# Patient Record
Sex: FEMALE | Race: BLACK OR AFRICAN AMERICAN | NOT HISPANIC OR LATINO | Employment: PART TIME | URBAN - METROPOLITAN AREA
[De-identification: names, ages, dates, MRNs, and addresses within clinical notes are randomized per-mention and may not be internally consistent; named-entity substitution may affect disease eponyms.]

---

## 2020-10-13 ENCOUNTER — HISTORICAL (OUTPATIENT)
Dept: ADMINISTRATIVE | Facility: HOSPITAL | Age: 35
End: 2020-10-13

## 2020-10-15 LAB — FINAL CULTURE: NORMAL

## 2020-11-06 ENCOUNTER — HOSPITAL ENCOUNTER (OUTPATIENT)
Dept: MEDSURG UNIT | Facility: HOSPITAL | Age: 35
End: 2020-11-07
Attending: OBSTETRICS & GYNECOLOGY | Admitting: OBSTETRICS & GYNECOLOGY

## 2020-11-06 LAB
ABS NEUT (OLG): 6.37 X10(3)/MCL (ref 2.1–9.2)
ALBUMIN SERPL-MCNC: 3.6 GM/DL (ref 3.5–5)
ALBUMIN/GLOB SERPL: 0.9 RATIO (ref 1.1–2)
ALP SERPL-CCNC: 52 UNIT/L (ref 40–150)
ALT SERPL-CCNC: 12 UNIT/L (ref 0–55)
AMYLASE SERPL-CCNC: 53 UNIT/L (ref 25–125)
APPEARANCE, UA: ABNORMAL
AST SERPL-CCNC: 15 UNIT/L (ref 5–34)
B-HCG FREE SERPL-ACNC: ABNORMAL MIU/ML
BACTERIA #/AREA URNS AUTO: ABNORMAL /HPF
BASOPHILS # BLD AUTO: 0 X10(3)/MCL (ref 0–0.2)
BASOPHILS NFR BLD AUTO: 0 %
BILIRUB SERPL-MCNC: 0.5 MG/DL
BILIRUB UR QL STRIP: NEGATIVE
BILIRUBIN DIRECT+TOT PNL SERPL-MCNC: 0.2 MG/DL (ref 0–0.5)
BILIRUBIN DIRECT+TOT PNL SERPL-MCNC: 0.3 MG/DL (ref 0–0.8)
BUN SERPL-MCNC: 6 MG/DL (ref 7–18.7)
CALCIUM SERPL-MCNC: 8.8 MG/DL (ref 8.4–10.2)
CHLORIDE SERPL-SCNC: 105 MMOL/L (ref 98–107)
CO2 SERPL-SCNC: 24 MMOL/L (ref 22–29)
COLOR UR: YELLOW
CREAT SERPL-MCNC: 0.68 MG/DL (ref 0.55–1.02)
CROSSMATCH INTERPRETATION: NORMAL
EOSINOPHIL # BLD AUTO: 0.3 X10(3)/MCL (ref 0–0.9)
EOSINOPHIL NFR BLD AUTO: 3 %
ERYTHROCYTE [DISTWIDTH] IN BLOOD BY AUTOMATED COUNT: 13.4 % (ref 11.5–14.5)
GLOBULIN SER-MCNC: 3.8 GM/DL (ref 2.4–3.5)
GLUCOSE (UA): NEGATIVE
GLUCOSE SERPL-MCNC: 83 MG/DL (ref 74–100)
HCT VFR BLD AUTO: 29.5 % (ref 35–46)
HGB BLD-MCNC: 9.4 GM/DL (ref 12–16)
HGB UR QL STRIP: >1 MG/DL
HYALINE CASTS #/AREA URNS LPF: ABNORMAL /LPF
IMM GRANULOCYTES # BLD AUTO: 0.03 10*3/UL
IMM GRANULOCYTES NFR BLD AUTO: 0 %
KETONES UR QL STRIP: >150 MG/DL
LEUKOCYTE ESTERASE UR QL STRIP: NEGATIVE
LIPASE SERPL-CCNC: 21 U/L
LYMPHOCYTES # BLD AUTO: 1.9 X10(3)/MCL (ref 0.6–4.6)
LYMPHOCYTES NFR BLD AUTO: 21 %
MCH RBC QN AUTO: 29.5 PG (ref 26–34)
MCHC RBC AUTO-ENTMCNC: 31.9 GM/DL (ref 31–37)
MCV RBC AUTO: 92.5 FL (ref 80–100)
MONOCYTES # BLD AUTO: 0.7 X10(3)/MCL (ref 0.1–1.3)
MONOCYTES NFR BLD AUTO: 8 %
MUCOUS THREADS URNS QL MICRO: SLIGHT
NEUTROPHILS # BLD AUTO: 6.37 X10(3)/MCL (ref 2.1–9.2)
NEUTROPHILS NFR BLD AUTO: 69 %
NITRITE UR QL STRIP: NEGATIVE
PH UR STRIP: 6 [PH] (ref 4.5–8)
PLATELET # BLD AUTO: 141 X10(3)/MCL (ref 130–400)
PMV BLD AUTO: 12.1 FL (ref 7.4–10.4)
POC BETA-HCG (QUAL): POSITIVE
POTASSIUM SERPL-SCNC: 3.8 MMOL/L (ref 3.5–5.1)
PRODUCT READY: NORMAL
PROT SERPL-MCNC: 7.4 GM/DL (ref 6.4–8.3)
PROT UR QL STRIP: 100 MG/DL
RBC # BLD AUTO: 3.19 X10(6)/MCL (ref 4–5.2)
RBC #/AREA URNS AUTO: >=100 /HPF
SARS-COV-2 AG RESP QL IA.RAPID: NEGATIVE
SODIUM SERPL-SCNC: 138 MMOL/L (ref 136–145)
SP GR UR STRIP: 1.03 (ref 1–1.03)
SQUAMOUS #/AREA URNS LPF: ABNORMAL /LPF
UROBILINOGEN UR STRIP-ACNC: NORMAL
WBC # SPEC AUTO: 9.3 X10(3)/MCL (ref 4.5–11)
WBC #/AREA URNS AUTO: ABNORMAL /HPF

## 2020-11-07 LAB
ABS NEUT (OLG): 10.74 X10(3)/MCL (ref 2.1–9.2)
ABS NEUT (OLG): 8.97 X10(3)/MCL (ref 2.1–9.2)
BASOPHILS # BLD AUTO: 0 X10(3)/MCL (ref 0–0.2)
BASOPHILS NFR BLD AUTO: 0 %
BASOPHILS NFR BLD MANUAL: 0 %
EOSINOPHIL # BLD AUTO: 0.3 X10(3)/MCL (ref 0–0.9)
EOSINOPHIL NFR BLD AUTO: 2 %
EOSINOPHIL NFR BLD MANUAL: 1 %
ERYTHROCYTE [DISTWIDTH] IN BLOOD BY AUTOMATED COUNT: 13.4 % (ref 11.5–14.5)
ERYTHROCYTE [DISTWIDTH] IN BLOOD BY AUTOMATED COUNT: 13.4 % (ref 11.5–14.5)
GRANULOCYTES NFR BLD MANUAL: 93 % (ref 43–75)
HCT VFR BLD AUTO: 27.6 % (ref 35–46)
HCT VFR BLD AUTO: 28.3 % (ref 35–46)
HGB BLD-MCNC: 8.6 GM/DL (ref 12–16)
HGB BLD-MCNC: 8.8 GM/DL (ref 12–16)
IMM GRANULOCYTES # BLD AUTO: 0.04 10*3/UL
IMM GRANULOCYTES NFR BLD AUTO: 0 %
LYMPHOCYTES # BLD AUTO: 2.1 X10(3)/MCL (ref 0.6–4.6)
LYMPHOCYTES NFR BLD AUTO: 15 %
LYMPHOCYTES NFR BLD MANUAL: 5 % (ref 20.5–51.1)
MCH RBC QN AUTO: 29.7 PG (ref 26–34)
MCH RBC QN AUTO: 30.2 PG (ref 26–34)
MCHC RBC AUTO-ENTMCNC: 31.1 GM/DL (ref 31–37)
MCHC RBC AUTO-ENTMCNC: 31.2 GM/DL (ref 31–37)
MCV RBC AUTO: 95.2 FL (ref 80–100)
MCV RBC AUTO: 97.3 FL (ref 80–100)
MONOCYTES # BLD AUTO: 0.6 X10(3)/MCL (ref 0.1–1.3)
MONOCYTES NFR BLD AUTO: 4 %
MONOCYTES NFR BLD MANUAL: 0 % (ref 2–9)
NEUTROPHILS # BLD AUTO: 10.74 X10(3)/MCL (ref 2.1–9.2)
NEUTROPHILS NFR BLD AUTO: 78 %
NEUTS BAND NFR BLD MANUAL: 1 % (ref 0–10)
PLATELET # BLD AUTO: 140 X10(3)/MCL (ref 130–400)
PLATELET # BLD AUTO: 222 X10(3)/MCL (ref 130–400)
PLATELET # BLD EST: ABNORMAL 10*3/UL
PMV BLD AUTO: 11.5 FL (ref 7.4–10.4)
PMV BLD AUTO: 11.8 FL (ref 7.4–10.4)
RBC # BLD AUTO: 2.9 X10(6)/MCL (ref 4–5.2)
RBC # BLD AUTO: 2.91 X10(6)/MCL (ref 4–5.2)
RBC MORPH BLD: NORMAL
WBC # SPEC AUTO: 13.8 X10(3)/MCL (ref 4.5–11)
WBC # SPEC AUTO: 9.6 X10(3)/MCL (ref 4.5–11)

## 2021-12-25 ENCOUNTER — HOSPITAL ENCOUNTER (OUTPATIENT)
Dept: MEDSURG UNIT | Facility: HOSPITAL | Age: 36
End: 2021-12-26
Attending: OBSTETRICS & GYNECOLOGY | Admitting: OBSTETRICS & GYNECOLOGY

## 2021-12-25 LAB
ABS NEUT (OLG): 7.12 X10(3)/MCL (ref 2.1–9.2)
ALBUMIN SERPL-MCNC: 3.3 GM/DL (ref 3.5–5)
ALBUMIN/GLOB SERPL: 0.9 RATIO (ref 1.1–2)
ALP SERPL-CCNC: 45 UNIT/L (ref 40–150)
ALT SERPL-CCNC: 11 UNIT/L (ref 0–55)
APPEARANCE, UA: CLEAR
AST SERPL-CCNC: 14 UNIT/L (ref 5–34)
B-HCG FREE SERPL-ACNC: 1353.99 MIU/ML
BACTERIA SPEC CULT: ABNORMAL
BASOPHILS # BLD AUTO: 0 X10(3)/MCL (ref 0–0.2)
BASOPHILS NFR BLD AUTO: 0 %
BILIRUB SERPL-MCNC: 0.2 MG/DL
BILIRUB UR QL STRIP: NEGATIVE
BILIRUBIN DIRECT+TOT PNL SERPL-MCNC: 0.1 MG/DL (ref 0–0.5)
BILIRUBIN DIRECT+TOT PNL SERPL-MCNC: 0.1 MG/DL (ref 0–0.8)
BUN SERPL-MCNC: 12.2 MG/DL (ref 7–18.7)
CALCIUM SERPL-MCNC: 8.7 MG/DL (ref 8.7–10.5)
CHLORIDE SERPL-SCNC: 105 MMOL/L (ref 98–107)
CO2 SERPL-SCNC: 24 MMOL/L (ref 22–29)
COLOR UR: ABNORMAL
CREAT SERPL-MCNC: 0.7 MG/DL (ref 0.55–1.02)
EOSINOPHIL # BLD AUTO: 0.3 X10(3)/MCL (ref 0–0.9)
EOSINOPHIL NFR BLD AUTO: 3 %
ERYTHROCYTE [DISTWIDTH] IN BLOOD BY AUTOMATED COUNT: 13.4 % (ref 11.5–14.5)
ERYTHROCYTE [DISTWIDTH] IN BLOOD BY AUTOMATED COUNT: 13.4 % (ref 11.5–14.5)
GLOBULIN SER-MCNC: 3.5 GM/DL (ref 2.4–3.5)
GLUCOSE (UA): NORMAL /UL
GLUCOSE SERPL-MCNC: 78 MG/DL (ref 74–100)
HCT VFR BLD AUTO: 26.5 % (ref 35–46)
HCT VFR BLD AUTO: 28.4 % (ref 35–46)
HGB BLD-MCNC: 8.4 GM/DL (ref 12–16)
HGB BLD-MCNC: 9 GM/DL (ref 12–16)
HGB UR QL STRIP: NEGATIVE /HPF
HYALINE CASTS #/AREA URNS LPF: ABNORMAL /LPF
IMM GRANULOCYTES # BLD AUTO: 0.03 10*3/UL
IMM GRANULOCYTES NFR BLD AUTO: 0 %
KETONES UR QL STRIP: NEGATIVE /UL
LEUKOCYTE ESTERASE UR QL STRIP: NEGATIVE
LIPASE SERPL-CCNC: 31 U/L
LYMPHOCYTES # BLD AUTO: 2.1 X10(3)/MCL (ref 0.6–4.6)
LYMPHOCYTES NFR BLD AUTO: 20 %
MCH RBC QN AUTO: 29.5 PG (ref 26–34)
MCH RBC QN AUTO: 29.7 PG (ref 26–34)
MCHC RBC AUTO-ENTMCNC: 31.7 GM/DL (ref 31–37)
MCHC RBC AUTO-ENTMCNC: 31.7 GM/DL (ref 31–37)
MCV RBC AUTO: 93.1 FL (ref 80–100)
MCV RBC AUTO: 93.6 FL (ref 80–100)
MONOCYTES # BLD AUTO: 0.9 X10(3)/MCL (ref 0.1–1.3)
MONOCYTES NFR BLD AUTO: 9 %
MUCOUS THREADS URNS QL MICRO: ABNORMAL /LPF
NEUTROPHILS # BLD AUTO: 7.12 X10(3)/MCL (ref 2.1–9.2)
NEUTROPHILS NFR BLD AUTO: 68 %
NITRITE UR QL STRIP: NEGATIVE
NRBC BLD AUTO-RTO: 0 % (ref 0–0.2)
NRBC BLD AUTO-RTO: 0 % (ref 0–0.2)
PH UR STRIP: 6 /UL (ref 4.5–8)
PLATELET # BLD AUTO: 136 X10(3)/MCL (ref 130–400)
PLATELET # BLD AUTO: 210 X10(3)/MCL (ref 130–400)
PMV BLD AUTO: 11.6 FL (ref 7.4–10.4)
PMV BLD AUTO: 12.5 FL (ref 7.4–10.4)
POC BETA-HCG (QUAL): POSITIVE
POTASSIUM SERPL-SCNC: 3.7 MMOL/L (ref 3.5–5.1)
PROT SERPL-MCNC: 6.8 GM/DL (ref 6.4–8.3)
PROT UR QL STRIP: NEGATIVE /UL
RBC # BLD AUTO: 2.83 X10(6)/MCL (ref 4–5.2)
RBC # BLD AUTO: 3.05 X10(6)/MCL (ref 4–5.2)
RBC #/AREA URNS HPF: ABNORMAL /HPF
SARS-COV-2 AG RESP QL IA.RAPID: NEGATIVE
SODIUM SERPL-SCNC: 136 MMOL/L (ref 136–145)
SP GR UR STRIP: 1.02 (ref 1–1.03)
SQUAMOUS EPITHELIAL, UA: ABNORMAL /LPF
UROBILINOGEN UR STRIP-ACNC: NORMAL /HPF
WBC # SPEC AUTO: 10.5 X10(3)/MCL (ref 4.5–11)
WBC # SPEC AUTO: 9.6 X10(3)/MCL (ref 4.5–11)
WBC #/AREA URNS HPF: ABNORMAL /HPF

## 2021-12-26 LAB
CROSSMATCH INTERPRETATION: NORMAL
PRODUCT READY: NORMAL

## 2021-12-28 LAB — FINAL CULTURE: NORMAL

## 2022-04-10 ENCOUNTER — HISTORICAL (OUTPATIENT)
Dept: ADMINISTRATIVE | Facility: HOSPITAL | Age: 37
End: 2022-04-10
Payer: MEDICAID

## 2022-04-26 VITALS
DIASTOLIC BLOOD PRESSURE: 67 MMHG | OXYGEN SATURATION: 100 % | WEIGHT: 213.88 LBS | HEIGHT: 63 IN | BODY MASS INDEX: 37.89 KG/M2 | SYSTOLIC BLOOD PRESSURE: 94 MMHG

## 2022-05-03 NOTE — HISTORICAL [facility] CERNER
This is a historical note converted from Cerner. Formatting and pictures may have been removed.  Please reference Cerner for original formatting and attached multimedia. Indication for Surgery  37 yo  presented with suspected ruptured ectopic pregnancy. Adnexal mass and free fluid on imaging.  Preoperative Diagnosis  1. Ruptured ectopic pregnancy  2. Hemoperitoneum  Postoperative Diagnosis  Same  Operation  Laparoscopic right salpingectomy, evacuation of hemoperitoneum  Surgeon(s)  Attending: Anahi Hurd MD  Resident: Bhumi Hurd MD (-IV)  Anesthesia  General endotracheal  Estimated Blood Loss  10 ml  ~300 ml hemoperitoneum  Urine Output  100 ml  Findings  EUA: normal external female genitalia, vaginal epithelium without lesion, anterior and posterior prolapse evident, cervix visualized without lesion, uterus 6 cm, mobile with good descent, adequate vaginal capacity  Intraop: no evidence of vascular or bowel injury upon entry, small omental adhesions to anterior abdominal wall, ~2 cm adhesion of epiploica of descending colon to anterior abdominal wall, ~300 ml hemoperitoneum in pelvis and paracolic gutters, right tubal ectopic pregnancy visible approximately 2 cm diameter with active bleeding noted, right ovary with cyst consistent with corpus luteum, otherwise normal in appearance, left fallopian tube surgically absent, left ovary normal in appearance, uterus normal in appearance  Specimen(s)  Right fallopian tube with ectopic pregnancy  Complications  None  Technique  The patient was taken to the operating room with IVF running.??SCDs were placed on bilateral lower extremities for DVT prophylaxis. General anesthesia was obtained without difficulty and found to be adequate. She was placed in the dorsal lithotomy position with legs in Stu type stirrups.??Arms were carefully padded and tucked at the patients side. Exam under anesthesia revealed the findings as above.??The abdomen and pelvis were  prepped and draped in the normal sterile fashion. A garcía catheter was placed to drain the bladder. A speculum used to visualize cervix, anterior lip of cervix grasped with tenaculum and?Humi manipulator placed without difficulty.?  ?  A 5mm skin incision was made at the left mid abdomen.?A 5 mm visiport trocar with the 0-degree laparoscope was inserted into the abdomen while the abdominal wall was tented upward, all abominal layers were identified while entering the abdomen.??The obturator was removed and placement confirmed with the laparoscope.?The abdomen and pelvis were insufflated with CO2 gas to a level of 15 mmHg.?No visceral or vascular injury occurred with entry into abdomen.?Initial survey of the upper abdomen was performed, with findings as above. The patient was placed in steep Trendelenberg to keep the bowel out of the operative field.?Two additional 5 mm trocar were inserted into the right mid and lower quadrant under direct laparoscopic visualization.?A survey of the abdomen and pelvis was completed with findings as above.?  ?   The?Ligasure was then used to carefully?take down?omental adhesions to the anterior abdominal wall. A 10mm umbilical trocar was then placed under direct visualization.?Adhesion of epiploica of descending colon then?carefully dissected and taken down with Ligasure careful that adhesion contained no bowel. A portion of hemoperitoneum was then evacuated.  The?identified ectopic pregnancy and associated?right fallopian tube was then excised using the Ligasure device. An endocatch bag was inserted and the specimen was placed inside. Bag removed via umbilical trocar site without difficulty and the specimen was handed off to be sent for pathology. Remainder of hemoperitoneum was then evacuated. Adequate hemostasis noted.?  ?  All instruments were removed from the abdomen?and the vagina. Tenaculum sites hemostatic. The abdomen was de-sufflated and all trocars were removed. The trocar  sites were closed with?Skin affix glue. All the instrument and sponge counts were correct x 2. The patient was awakened from general anesthesia without difficulty and taken to the recovery room in stable condition.??  ?  The patient tolerated the procedure well. All instrument and sponge counts were correct times two. The patient was taken to the recovery room in a stable condition.? present and scrubbed for procedure.??  ?   Bhumi Hurd MD  [facility], PGY-4  ?   I was present with the resident during all critical and key portions of the procedure and agree with the findings documented in the residents note.  Dr. CATHERINE Hurd MD  ?

## 2022-05-03 NOTE — HISTORICAL [facility] CERNER
This is a historical note converted from Keysha. Formatting and pictures may have been removed.  Please reference Keysha for original formatting and attached multimedia. Chief Complaint  upper ? abdominal ?pain  History of Present Illness  35 yo  at 9w2d by LMP presents with periumbilical pain that started suddenly today right after waking up. Pain severe enough that she felt she may pass out. Denies any unusual activity. Throughout the day pain moved to lower abdomen, R>L. Reports felt okay then had to rest when pain became severe again in middle of day. Worse with movement. Has been tolerating PO without any nausea or vomiting, last ate around 5 pm. Denies any vaginal bleeding.  ?  Of note, patient has had pregnancy of unknown location since first week of December. Was being followed with beta hcg and ultrasound outpatient at private clinic. Last had ultrasound on  but doesnt know results.  ?  ObHx:  H/o ectopic with surgical management. Had left salpingectomy (laparoscopic); after tubal re-anastomosis  SAB x1   x5  ?  GynHx:  Denies STIs  Denies abnormal Pap smears  Normal cycles outside of pregnancy  ?  PMHx:  Denies  ?  PSHx:  Postpartum tubal ligation (2014)  Tubal re-anastomosis via Pfannenstiel?(2019)  ?  Review of Systems  *Positive ROS are in bold, otherwise negative  ?   General: weight loss, weight gain, fevers/chills  GI: abdominal pain, diarrhea, constipation  CV: chest pain, palpitations  Resp: SOB, chronic cough, wheezing  Gyn: see HPI  : dysuria, frequency  Neuro: migraine headaches, frequent dizziness  MSK: joint, back pain  Physical Exam  Vitals & Measurements  T:?37? ?C (Oral)? TMIN:?36.8? ?C (Oral)? TMAX:?37? ?C (Oral)? HR:?97(Peripheral)? RR:?20? BP:?110/72? SpO2:?100%?  Gen: alert, oriented, NAD  CV: regular rate  Resp: unlabored work of breathing  Abdomen: soft, non-distended, bilateral lower quadrant tenderness, +rebound tenderness  Ext: no edema or  erythema  Assessment/Plan  1.?Ectopic pregnancy?O00.90  40 yo  with?ruptured ectopic pregnancy. Right adnexal mass?suspicious for ectopic with CRL 2.68 cm (9w3d) and free fluid throughout pelvis. Rebound tenderness on exam. Recommend surgical management. Discussed laparoscopic salpingectomy, small possibility for salpingostomy.?Patient voiced understanding and that she knows she will likely have right fallopian tube removed. Surgical consents signed.  ?   Rh positive. No Rhogam indicated.  ?   Will proceed to OR.  ?   Discussed with Dr. Hurd  ?   Bhumi Hurd MD  [facility], PGY-4?  Orders:  Lactated Ringers Injection intravenous solution 1,000 mL, 1,000 mL, 1,000 mL, IV, 125 mL/hr, start date 21 0:14:00 CST, 1.95, m2  Below the Knee Intermittent Pneumatic Compression Device, 21 0:14:00 CST, Constant Order  MD to Nurse, 21 0:14:00 CST, If patient is on a beta blocker at home, give with small sip of water on morning of surgery  NPO, 21 0:14:00 CST, CM NPO  Place in Outpatient Observation, 21 0:14:00 CST, Medical Unit Provost CORONEL, Anahi Peralta, Maura  Remove Compression Device, Inspect skin, Reapply, and Document Assessment, 21 0:14:00 CST, qShift  Surgical Care Quality Measures, 21 0:14:00 CST, Stop date 21 0:14:00 CST  Vital Signs, 21 0:14:00 CST, Once, Stop date 21 0:14:00 CST  ?  ?  39 F P5 with ruptured ectopic  I have met this patient and agree with the above history and physical.  She understands the procedure as described above with associated risks.  All questions have been answered and consents have been signed.  Procedure: LSC salpingostomy vs salpingectomy  ?   Dr. Anahi Hurd MD   Problem List/Past Medical History  Ongoing  Obesity  Procedure/Surgical History  Lap Salpingectomy (Left) (2020)  Excision of Left Fallopian Tube, Percutaneous Endoscopic Approach (2020)  Laparoscopic treatment of ectopic pregnancy; with  salpingectomy and/or oophorectomy (11/06/2020)  reversal of tubal ligation  Tubal ligation   Medications  Inpatient  No active inpatient medications  Home  None  Allergies  No Known Allergies  Social History  Abuse/Neglect  No, 12/25/2021  Alcohol  Never, 04/24/2019  Employment/School  Employed, 11/20/2020  Exercise  Exercise duration: 0., 11/20/2020  Home/Environment  Lives with Children, Significant other., 11/20/2020  Nutrition/Health  Regular, 11/20/2020  Sexual  Sexually active: Yes. Number of current partners 1. Sexual orientation: Straight or heterosexual. Gender Identity Identifies as female. No, 11/20/2020  Substance Use  Never, 04/24/2019  Tobacco  Never (less than 100 in lifetime), N/A, 12/25/2021  Family History  Diabetes mellitus type 2: Father.  Lab Results  Test Name Test Result Date/Time Comments   Creatinine 0.70 mg/dL 12/25/2021 20:25 CST    Beta hCG Qnt 1353.99 mIU/mL (High) 12/25/2021 20:25 CST Quantitative Beta hCG reference range:  ?  Approximate gestational age ? ? Approximate hCG range (mIU/mL)  0.2-1 week ? ? ? ? ? ? ? ? ? ? ? ? ? ? ? ? ?5-50  1-2 weeks ? ? ? ? ? ? ? ? ? ? ? ? ? ? ? ? ? ?  2-3 weeks ? ? ? ? ? ? ? ? ? ? ? ? ? ? ? ? ? ?100-5000  3-4 weeks ? ? ? ? ? ? ? ? ? ? ? ? ? ? ? ? ? ?500-10,000  4-5 weeks ? ? ? ? ? ? ? ? ? ? ? ? ? ? ? ? ? ?1,000-50,000  5-6 weeks ? ? ? ? ? ? ? ? ? ? ? ? ? ? ? ? ? ?10,000-100,000  6-8 weeks ? ? ? ? ? ? ? ? ? ? ? ? ? ? ? ? ? ?15,000-200,000  8-12 weeks ? ? ? ? ? ? ? ? ? ? ? ? ? ? ? ? ?10,000-100,000  ?  Diagnosis and monitoring of trophoblastic neoplasia should not be based on  serum chrionic gonadotropin levels alone.  Less than 5 mIU/mL = negative   WBC 10.5 x10(3)/mcL 12/25/2021 20:25 CST    Hgb 9.0 gm/dL (Low) 12/25/2021 20:25 CST    Hct 28.4 % (Low) 12/25/2021 20:25 CST    Platelet 210 x10(3)/mcL 12/25/2021 20:25 CST    Diagnostic Results  Final reads pending

## 2022-05-03 NOTE — HISTORICAL [facility] CERNER
This is a historical note converted from Cerreji. Formatting and pictures may have been removed.  Please reference Cerreji for original formatting and attached multimedia. Chief Complaint  Abdominal pain x2 days, states I thought it was my bowels so I took something.  History of Present Illness  35yo  who presents to the ED with complaints of abdominal pain. Reporting paraumbilical abdominal discomfort that started yesterday evening with some associated nausea and vomiting that has since resolved. Pinpoint paraumbilical pain without any radiation. LMP . Complaints of vaginal spotting that has been ongoing since 10/18. She presented to OSH at that time for assessment of vaginal bleeding and was found to have beta of 4200 with TVUS showing nothing in the uterus and a cyst noted on her left tube. Patient followed up with her provider in [place] her repeated her beta a week later with rise to 5900 and no changes in radiographical imaging. Since that time she had a repeat beta this week reported as 1 and repeat imaging with resolution in tubal cyst. Has continued to have vaginal bleeding with passage of tissue last night, pictures provided on patients phone, with decreased in vaginal bleeding since passage of tissue.  ?  OB Hx: FT  x5, SAB x2  GYN Hx: Denies any abnormal pap smears or STIs.  PMH: Denies  PSH: Bilateral tubal ligation and tubal reversal (2019)  Meds: None  All: NKDA  FMH: Denies h/o ovarian, breast, colon, or endometrial cancer  Review of Systems  The patient denies the following:?? (positive ROS is highlighted)  Constitutional:?fever/chills, night sweats, excessive fatigue  Gastrointestinal: frequent heartburn, abdominal pain, blood in stools, diarrhea, constipation  Hematologic: easy bruising, bleeding gums, prolonged bleeding, clotting problems  Cardiovascular: palpitations, trouble breathing when lying flat  Respiratory:?shortness of breath, chronic cough, wheezing  Skin:  itching,?rash, new moles or lesions  Psychosocial:? difficulty sleeping, anxiety or panic attacks,? depression  Gynecologic: see HPI  Urinary: hematuria, frequency, urgency, dysuria  Neurologic: headaches, dizziness, memory loss.  Musculoskeletal:?joint?stiffness,?joint?pain/swelling,?back pain.?  Physical Exam  Vitals & Measurements  Temp: 37.0C  BP: 110/74  HR: 95  RR: 18  O2: 100% ORA  General: NAD, A/Ox3  Respiratory: CTAB no wheezes, ronchi, crackles  Cardiovascular: RRR no murmurs, rubs, or gallops  Abdomen: soft, nondistended, tender to deep palpation in superior paraumbilical region. no hepatosplenomegaly, no masses palpated, normoactive bowel sounds?  Extremities: no edema, no calf tenderness  : Approximately 10cc clot of bright red blood in the vaginal vault with small amount of blood from the cervical os. No heavy active bleeding noted. No masses or lesions appreciated. On bimanual exam, cervical os is FT/T/H. 10 week size freely mobile uterus without any fundal tenderness. No CMT. No adnexal masses or tenderness.  Assessment/Plan  33yo  who presents with complaint of abdominal pain and vaginal spotting ongoing since 10/18 with passage of tissue yesterday evening resembling gestational sac. However, given abnormal trend in beta hcg with cystic structure in the right ovary, ectopic pregnancy cannot be ruled out especially given patients increased risk in the setting of prior bilateral tubal ligation and reversal.  -Counseled patient on the risks/benefits/alternatives of diagnostic laparoscopy. Patient verbalized understanding and has elected to proceed with surgery. She understands that risks involved with the procedure include but are not limited to bleeding, infection, damage to surrounding structure with potential need for additional operations, need for blood transfusion, or delayed complications.  -Type and screen and COVID swab ordered.  -Plan to proceed to the OR for diagnostic laparoscopy,  possible salpingectomy, possible salpingostomy, possible salpingo-oophorectomy.  ?   Lab Results  WBC - 9.3  H/H: 9.4/29.5  Plt: 141  Diagnostic Results  (2020 20:34 CST US OB 1st Trimester w Transvag if needed)  Radiology Report  EXAMINATION: First trimester OB ultrasound  ?  EXAMINATION DATE: 2020  ?  COMPARISON: None  ?  TECHNIQUE: Multiple sonographic images of the pelvis were obtained via  transabdominal and transvaginal imaging.  ?  CLINICAL HISTORY: Abdominal pain  ?  FINDINGS:  ?  The uterus is anteverted measuring 9.5 x 5.9 x 9.8 cm. A decidual  reaction is not identified in the endometrial complex measures 11 mm  without mass effect or effacement. The cervix measures 5 cm. No  evidence for gestational sac, severe gestational sac, fetal pole, or  yolk sac.  ?  Right ovary measures 3.3 x 2.3 x 3.5 cm. Normal arterial inflow and  venous outflow waveforms are identified. A cystic lesion is identified  within the region of the right ovary measuring 1.9 x 1.3 x 1.7 cm. No  significant peripheral vascular flow is identified. This could  represent a corpus luteal cyst, however, ectopic pregnancy is not  entirely excluded. The left ovary measures 4.3 x 2.2 x 2.7 cm. Normal  arterial inflow and venous outflow waveforms are identified. Fluid is  identified in the pelvic cul-de-sac.  ?  IMPRESSION:  1. Given positive beta hCG no intrauterine gestation is identified.  2. A complex cystic lesion with a rind is identified in the right  ovary without definitive intense peripheral vascular flow. This could  represent a corpus luteal cyst, however, an ectopic is not entirely  excluded. Recommend serial beta-hCGs and follow-up imaging if  warranted.  ? [1]     [1]?US OB 1st Trimester w Transvag if needed; Julio Calderon DO W 2020 20:34 CST   33 yo F  with pos beta HCG of 13,000, free fluid in pelvis and nothing in uterus who presents with nausea, emesis, anemia/tachycardia and abdominal pain in the setting  of prior tubal reversal 10 months ago and this being her first pregnancy since.  ?  ? My diagnosis is ectopic pregnancy at this point, as I do not have any information to prove otherwise.  She does report a story of being followed by Dr. Navarro recently and suspected SAB, however the beta trend she mentioned didnt make sense as it would not go to 1 that quickly.? she also passed products versus clot/pseudosac or decidualization just yesterday.  She is anemic as well.? At this point, I am obligated to view her pelvis to rule out any abnormal gestation.? We have discussed that we will not disrupt her tubes if there is nothing of concern present.? If there is an ectopic, then likely we will need to remove a tube.  She has been consented verbally and in writing for diagnostic laparoscopy with treatment of ectopic pregnancy if present.?  Otherwise, I do not have suspicion this is appendicitis as she has no leukocytosis and no RLQ ttp.? Has remained comfortable during her ED stay.   Of note, Tati attempted to review the US images myself however have been unable to load these on several attempts this evening.

## 2022-05-03 NOTE — HISTORICAL [facility] CERNER
This is a historical note converted from Cerreji. Formatting and pictures may have been removed.  Please reference Cerreji for original formatting and attached multimedia. ?  OPERATIVE NOTE  ?     ?  Date of surgery:??2020  Preoperative diagnosis: Ectopic Pregnancy, History of Tubal Reversal Surgery (Dec, 2019) via pfannensteil incision  Postoperative diagnosis: Same, Ruptured Ectopic Pregnancy of Left Fallopian Tube  Procedure: Diagnostic Laparoscopy, Laparoscopic Left Salpingectomy  Surgeon: Dr. Andriy Urban, PGY4  Assistant: Dr. Warren Whiteside, PGY2  Attending: Dr. Florence Stallworth  Anesthesia: General  EBL: 370 mL (350 mL hemoperitoneum, 20 mL operative blood loss)  IVF: 700 mL  UOP: 200 mL  Specimen: Left Fallopian Tube Ectopic Pregnancy  Complications: None  Findings:  EUA: Normal Meño stage V external genitalia, scant?blood and?small clot at?introitus. Later,?upon observation with patient valsalva, anterior and posterior vaginal?prolapse noted to level of introitus.  Intraoperative findings: No visceral or vascular injury?noted upon?initial entry. Hemoperitoneum noted extending up to level of the liver. Normal appearing liver, spleen,?bowel, bladder. Omental and bowel adhesion at midline abdominal wall near uterus. Normal appearing uterus and right fallopian tube and ovary. Normal appearing left ovary, left?Fallopian tube enlarged, with evidence of rupture and ongoing bleeding. Photos scanned in via capture osmel.  ?  Indication and Consent:  Patient is a?35 yo ?with history of tubal reversal in 2019 and high suspicion for ectopic pregnancy currently. The risks, benefits, and alternatives of laparoscopic surgery and local excision were reviewed with the patient. She understood that the risks include but are not limited to pain, injury to surrounding organs, blood loss, need for transfusion, infection, and/or need for reoperation. The patient agreed to the procedure and signed the  consent forms at her pre-operative visit.?  ?  Procedure: The patient was taken to the OR with IV fluids running. SCDs were applied to the lower extremities. General anesthesia was administered without difficulty. She was positioned in the dorsal lithotomy position with Stu-type stirrups. EUA revealed findings as above.?The patient was prepared and draped in the normal sterile fashion. A garcía catheter was inserted to empty the bladder. A?sponge stick was placed in the vagina for assistance with manipulation.  ?  Attention was then paid to the laparoscopic portion of the procedure. 1% lidocaine was injected?beneath the?umbilicus?at level of her prior?postpartum tubal ligation scar. A skin incision was made with the scalpel. ?The abdomen was elevated and the 5 mm long Visiport was introduced under direct visualization of the laparoscope. ?All of the abdominal wall layers were identified. ?Placement into the abdominal cavity was confirmed with visualization of the omentum. ?The abdomen was then insufflated with CO2 to a pressure of 15 mmHg. ?A survey of the abdomen was performed with the aforementioned findings as listed above. ?The bowel and omentum immediately below the trocar entry site were inspected and no damage noted. ?Two additional 5 mm trocars were inserted in a similar manner under direct visualization approximately?6cm to the right and left of the umbilicus in the lower quadrants. The patient was then placed in Trendelenberg to facilitate visualization of the pelvis. ?The pelvic anatomy was noted as above.  ?  The left ruptured fallopian tube was elevated away from the pelvic sidewall with atraumatic graspers. Using the bipolar instrument, the mesosalpinx was sequentially clamped, dessicated, and cut. The?specimen was placed in the anterior cul de sac. Additional hemostasis at the site?was achieved with bipolar instrument.?Suction  was introduced and systematically used to break  down?approximately 350 mL of hemoperitoneum.  ?   Umbilical trocar was then removed and ryan bag was introduced. With assistance of atraumatic grasper, specimen and blood clot were placed into the bag, which was then removed from the abdomen and sent to pathology for review.?Generous irrigation of the pelvis and upper abdomen were?performed and?hemoperitoneum was?suctioned.  ?  Two of the abdominal trocars were removed from the abdomen under direct visualization of the laparoscope. ?The abdomen was then desufflated. ?5 manual breaths were given by anesthesia while 1 trocar remained in place. ?The last trocar was then removed. ?The trocar incisions were closed with 4-0 monocryl. Umbilical incision included two deep dermal sutures.?Hemostasis was noted. ?  ?  The patient tolerated the procedure well. ?All instruments were removed from the abdomen and the vagina, and all counts were correct times two. ?The patient was taken to the recovery room in a stable condition. ??  ?   This certifies I was present during the entire period between opening and closing and scrubbed/ as well as assisted * throughout.

## 2022-05-03 NOTE — HISTORICAL [facility]ER
This is a historical note converted from [employer]. Formatting and pictures may have been removed.  Please reference [facility] for original formatting and attached multimedia. Admit and Discharge Dates  Admit Date: 2020  Discharge Date: 2020  Physicians  Attending Physician - Federica CORONEL, May S  Admitting Physician - Federica CORONEL, May S  Primary Care Physician - No PCP, No  Discharge Diagnosis  1.?Left tubal pregnancy without intrauterine pregnancy?O00.102  2.?Ruptured ectopic pregnancy?O00.90  3.?Post-operative state?Z98.890  Surgical Procedures  2020 - [id number] - Lap Salpingectomy  2020 - [id number] - Diagnostic Laparscopy  Immunizations  No immunizations recorded for this visit.  Admission Information  Admitted for pain control and assessment of status post-operative state  Hospital Course  35yo  who initially presented to the ED yesterday evening with complaints of abdominal pain, nausea/vomiting, vaginal bleeding, and was found to be anemic with a beta-hcg of 13k. Given clinical picture in the setting of significant risk factors for ectopic pregnancy given bilateral tubal ligation and subsequent reversal, the decision was made to proceed to the OR for diagnostic laparoscopy. Hemoperitoneum and ruptured left tubal ectopic were identified and unilateral left salpingectomy was performed. Immediately post-partum patient was tachycardic to the 120s and O2 saturation in the high 80s/low 90s. She was placed on 2L NC overnight with O2 saturation of 100%.?CBC obtained showed H/H 8.8/28.3 down from pre-operative H/H of 9.4/29.5. Patient received IV fluid hydration overnight. On HD#2 nasal canula was removed and O2 saturation was 98-99% on room air. Orthostatics performed that were negative. A repeat CBC was collected that resulted at?8.6/27.6. Pain was well controlled on current regimen and voiding spontaneously. She was deemed stable and suitable for discharge at this time with plan for  close outpatient follow up.  Objective  Vitals & Measurements  T:?37? ?C (Oral)? TMIN:?36.7? ?C (Oral)? TMAX:?37.2? ?C (Tympanic)? HR:?104(Peripheral)? RR:?16? BP:?94/66? BP:?102/68(Sitting)? BP:?109/71(Standing)? BP:?97/60(Supine)? SpO2:?97%? WT:?95.90?kg? BMI:?37.46?  Lab Results  Test Name Test Result Date/Time   WBC 9.6 x10(3)/mcL 11/07/2020 09:16 CST   WBC 13.8 x10(3)/mcL (High) 11/07/2020 02:23 CST   WBC 9.3 x10(3)/mcL 11/06/2020 18:34 CST   Hgb 8.6 gm/dL (Low) 11/07/2020 09:16 CST   Hgb 8.8 gm/dL (Low) 11/07/2020 02:23 CST   Hgb 9.4 gm/dL (Low) 11/06/2020 18:34 CST   Hct 27.6 % (Low) 11/07/2020 09:16 CST   Hct 28.3 % (Low) 11/07/2020 02:23 CST   Hct 29.5 % (Low) 11/06/2020 18:34 CST   Platelet 222 x10(3)/mcL 11/07/2020 02:23 CST   Platelet 141 x10(3)/mcL 11/06/2020 18:34 CST   Physical Exam  General: NAD, A/Ox3  Respiratory: CTAB no wheezes, ronchi, or crackles  Cardiovascular: RRR no murmurs, rubs, or gallops  Abdomen: soft, obese, nondistended, nontender to palpation, no hepatosplenomegaly, no masses palpated, normoactive bowel sounds?  Incision: 3 well approximated port sites without evidence of dehiscence. No active bleeding, discharge, or overlying erythema.  Extremities: no edema, no calf tenderness  Patient Discharge Condition  Good  Discharge Disposition  Discharge to home in stable condition   Discharge Medication Reconciliation  Prescribed  docusate (docusate sodium 100 mg oral capsule)?100 mg, Oral, Daily  ferrous sulfate (ferrous sulfate 324 mg (65 mg elemental iron) oral delayed release tablet)?324 mg, Oral, Daily  ibuprofen (ibuprofen 600 mg oral tablet)?600 mg, Oral, q8hr  Discontinue  acetaminophen-HYDROcodone (acetaminophen-hydrocodone 325 mg-7.5 mg oral tablet)?1 tab(s), Oral, q4hr  Education and Orders Provided  Ectopic Pregnancy  Salpingectomy  Follow up  Follow up with OB/GYN  ????Staff will call patient with information for follow up appointment with [facility] in 2 weeks.      I  reviewed all associated vitals, labs and related events.? Patient has tachycardia which I suspect is anemia related- she is not severely anemic to warrant transfusion by values, but only has tachycardia.? no hypotension in orthostatics with standing/sitting.? She feels well overall and has met all goals since overnight surgery (which is typically outpatient). She was kept in hospital through the morning/midday to evaluate these symptoms which improved for her.  ?   Stable and meeting all criteria for discharge.? Has scheduled follow up with us as above **Requested to be scheduled by our office staff in 2 weeks.

## 2023-07-22 ENCOUNTER — HOSPITAL ENCOUNTER (EMERGENCY)
Facility: HOSPITAL | Age: 38
Discharge: HOME | End: 2023-07-23
Payer: MEDICAID

## 2023-07-22 DIAGNOSIS — M54.50 ACUTE BILATERAL LOW BACK PAIN, UNSPECIFIED WHETHER SCIATICA PRESENT: Primary | ICD-10-CM

## 2023-07-22 PROCEDURE — 99284 EMERGENCY DEPT VISIT MOD MDM: CPT

## 2023-07-22 NOTE — Clinical Note
"Poonam "Poonam" Sujit was seen and treated in our emergency department on 7/22/2023.  She may return to work on 07/26/2023.       If you have any questions or concerns, please don't hesitate to call.      Fer GILLIS    "

## 2023-07-23 VITALS
OXYGEN SATURATION: 100 % | BODY MASS INDEX: 38.95 KG/M2 | DIASTOLIC BLOOD PRESSURE: 82 MMHG | TEMPERATURE: 98 F | HEART RATE: 84 BPM | RESPIRATION RATE: 18 BRPM | SYSTOLIC BLOOD PRESSURE: 125 MMHG | WEIGHT: 219.81 LBS | HEIGHT: 63 IN

## 2023-07-23 PROCEDURE — 25000003 PHARM REV CODE 250: Performed by: PHYSICIAN ASSISTANT

## 2023-07-23 PROCEDURE — 63600175 PHARM REV CODE 636 W HCPCS: Performed by: PHYSICIAN ASSISTANT

## 2023-07-23 PROCEDURE — 96372 THER/PROPH/DIAG INJ SC/IM: CPT | Performed by: PHYSICIAN ASSISTANT

## 2023-07-23 RX ORDER — INDOMETHACIN 25 MG/1
25 CAPSULE ORAL
Qty: 15 CAPSULE | Refills: 0 | Status: SHIPPED | OUTPATIENT
Start: 2023-07-23 | End: 2023-07-28

## 2023-07-23 RX ORDER — HYDROCODONE BITARTRATE AND ACETAMINOPHEN 5; 325 MG/1; MG/1
1 TABLET ORAL
Status: COMPLETED | OUTPATIENT
Start: 2023-07-23 | End: 2023-07-23

## 2023-07-23 RX ORDER — TRAMADOL HYDROCHLORIDE 50 MG/1
50 TABLET ORAL EVERY 12 HOURS PRN
Qty: 10 TABLET | Refills: 0 | Status: SHIPPED | OUTPATIENT
Start: 2023-07-23 | End: 2023-07-28

## 2023-07-23 RX ORDER — METHOCARBAMOL 100 MG/ML
500 INJECTION, SOLUTION INTRAMUSCULAR; INTRAVENOUS
Status: COMPLETED | OUTPATIENT
Start: 2023-07-23 | End: 2023-07-23

## 2023-07-23 RX ORDER — METHOCARBAMOL 500 MG/1
500 TABLET, FILM COATED ORAL 3 TIMES DAILY
Qty: 15 TABLET | Refills: 0 | Status: SHIPPED | OUTPATIENT
Start: 2023-07-23 | End: 2023-07-28

## 2023-07-23 RX ORDER — KETOROLAC TROMETHAMINE 30 MG/ML
30 INJECTION, SOLUTION INTRAMUSCULAR; INTRAVENOUS
Status: COMPLETED | OUTPATIENT
Start: 2023-07-23 | End: 2023-07-23

## 2023-07-23 RX ADMIN — KETOROLAC TROMETHAMINE 30 MG: 30 INJECTION, SOLUTION INTRAMUSCULAR; INTRAVENOUS at 01:07

## 2023-07-23 RX ADMIN — METHOCARBAMOL 500 MG: 100 INJECTION INTRAMUSCULAR; INTRAVENOUS at 01:07

## 2023-07-23 RX ADMIN — HYDROCODONE BITARTRATE AND ACETAMINOPHEN 1 TABLET: 5; 325 TABLET ORAL at 12:07

## 2023-07-23 NOTE — ED PROVIDER NOTES
"Encounter Date: 7/22/2023       History     Chief Complaint   Patient presents with    Back Pain     Low back pain radiating down left leg since "doing laundry this morning".       Patient reports to the ER with complaints of back pain with radiation       Review of patient's allergies indicates:  No Known Allergies  History reviewed. No pertinent past medical history.  Past Surgical History:   Procedure Laterality Date    ECTOPIC PREGNANCY SURGERY       History reviewed. No pertinent family history.  Social History     Tobacco Use    Smoking status: Never    Smokeless tobacco: Never   Substance Use Topics    Alcohol use: Not Currently    Drug use: Never     Review of Systems    Physical Exam     Initial Vitals [07/22/23 2206]   BP Pulse Resp Temp SpO2   125/82 84 17 97.6 °F (36.4 °C) 100 %      MAP       --         Physical Exam    ED Course   Procedures  Labs Reviewed - No data to display       Imaging Results    None          Medications - No data to display                           Clinical Impression:    ***Please document a Clinical Impression and click the "Refresh" button to refresh your note and automatically pull in before signing.***         "

## 2023-07-23 NOTE — ED PROVIDER NOTES
"Encounter Date: 7/22/2023       History     Chief Complaint   Patient presents with    Back Pain     Low back pain radiating down left leg since "doing laundry this morning".       Patient reports to the ER with complaints of lower back pain that started after bending down to  fallen laundry; pt denies any incontinence    The history is provided by the patient.   Back Pain   This is a new problem. The current episode started today. The problem occurs throughout the day. The problem has been unchanged. The pain is associated with twisting. The pain is present in the lumbar spine. The pain is The same all the time. Pertinent negatives include no chest pain, no fever, no numbness, no abdominal pain, no bowel incontinence, no bladder incontinence, no dysuria, no pelvic pain, no paresthesias and no weakness.   Review of patient's allergies indicates:  No Known Allergies  History reviewed. No pertinent past medical history.  Past Surgical History:   Procedure Laterality Date    ECTOPIC PREGNANCY SURGERY       History reviewed. No pertinent family history.  Social History     Tobacco Use    Smoking status: Never    Smokeless tobacco: Never   Substance Use Topics    Alcohol use: Not Currently    Drug use: Never     Review of Systems   Constitutional:  Negative for fever.   HENT:  Negative for sore throat.    Eyes: Negative.    Respiratory:  Negative for shortness of breath.    Cardiovascular:  Negative for chest pain.   Gastrointestinal:  Negative for abdominal pain, bowel incontinence and nausea.   Genitourinary:  Negative for bladder incontinence, dysuria and pelvic pain.   Musculoskeletal:  Positive for back pain.   Skin:  Negative for rash.   Neurological:  Negative for weakness, numbness and paresthesias.   Hematological:  Does not bruise/bleed easily.   Psychiatric/Behavioral: Negative.       Physical Exam     Initial Vitals [07/22/23 2206]   BP Pulse Resp Temp SpO2   125/82 84 17 97.6 °F (36.4 °C) 100 %    "   MAP       --         Physical Exam    Vitals reviewed.  Constitutional: She appears well-developed and well-nourished.   HENT:   Head: Normocephalic and atraumatic.   Eyes: Conjunctivae and EOM are normal. Pupils are equal, round, and reactive to light.   Neck: Neck supple.   Normal range of motion.  Cardiovascular:  Normal rate, regular rhythm, normal heart sounds and intact distal pulses.           Pulmonary/Chest: Breath sounds normal. No respiratory distress. She has no wheezes. She exhibits no tenderness.   Abdominal: Abdomen is soft. Bowel sounds are normal. There is no abdominal tenderness.   Musculoskeletal:      Cervical back: Normal, normal range of motion and neck supple.      Thoracic back: Normal.      Lumbar back: Tenderness present. No swelling or deformity. Decreased range of motion.        Back:      Neurological: She is alert and oriented to person, place, and time. She displays normal reflexes. No cranial nerve deficit or sensory deficit.   Skin: Skin is warm and dry.   Psychiatric: She has a normal mood and affect. Her behavior is normal. Judgment and thought content normal.       ED Course   Procedures  Labs Reviewed - No data to display       Imaging Results    None          Medications   methocarbamoL injection 500 mg (500 mg Intramuscular Given 7/23/23 0101)   ketorolac injection 30 mg (30 mg Intramuscular Given 7/23/23 0100)   HYDROcodone-acetaminophen 5-325 mg per tablet 1 tablet (1 tablet Oral Given 7/23/23 0059)     Medical Decision Making:   ED Management:    Patient declined to have any imaging at this time    Given strict ED return precautions. I have spoken with the patient and/or caregivers. I have explained the patient's condition, diagnoses and treatment plan based on the information available to me at this time. I have answered the patient's and/or caregiver's questions and addressed any concerns. The patient and/or caregivers have as good an understanding of the patient's  diagnosis, condition and treatment plan as can be expected at this point. The vital signs have been stable. The patient's condition is stable and appropriate for discharge from the emergency department.      The patient will pursue further outpatient evaluation with the primary care physician or other designated or consulting physician as outlined in the discharge instructions. The patient and/or caregivers are agreeable to this plan of care and follow-up instructions have been explained in detail. The patient and/or caregivers have received these instructions in written format and have expressed an understanding of the discharge instructions. The patient and/or caregivers are aware that any significant change in condition or worsening of symptoms should prompt an immediate return to this or the closest emergency department or a call to 911.                          Clinical Impression:   Final diagnoses:  [M54.50] Acute bilateral low back pain, unspecified whether sciatica present (Primary)        ED Disposition Condition    Discharge Stable          ED Prescriptions       Medication Sig Dispense Start Date End Date Auth. Provider    indomethacin (INDOCIN) 25 MG capsule Take 1 capsule (25 mg total) by mouth 3 (three) times daily with meals. for 5 days 15 capsule 7/23/2023 7/28/2023 ELAINA Delgado    methocarbamoL (ROBAXIN) 500 MG Tab Take 1 tablet (500 mg total) by mouth 3 (three) times daily. for 5 days 15 tablet 7/23/2023 7/28/2023 ELAINA Delgado    traMADoL (ULTRAM) 50 mg tablet Take 1 tablet (50 mg total) by mouth every 12 (twelve) hours as needed for Pain. 10 tablet 7/23/2023 7/28/2023 ELAINA Delgado          Follow-up Information       Follow up With Specialties Details Why Contact Info    discharge followup    If your symptoms become WORSE or you DO NOT IMPROVE and you are unable to reach your health care provider, you should RETURN to the emergency department    discharge info    Discussed  all pertinent ED information, results, diagnosis and treatment plan; All questions and concerns were addressed at this time. Patient voices understanding of information and instructions. Patient is comfortable with plan and discharge             ELAINA Delgado  07/23/23 0158       ELAINA Delgado  07/23/23 0158       ELAINA Delgado  07/23/23 0159